# Patient Record
(demographics unavailable — no encounter records)

---

## 2025-03-18 NOTE — ASSESSMENT
[FreeTextEntry1] : The patient comes in today for follow-up on her left hip.  She is now 2 months out from an open reduction internal fixation of intertrochanteric hip fracture.  Overall she is doing very well.  She is starting to walk again with physical therapy.  She does continue to have some pain and requires oxycodone 5 mg twice a day.  Examination of the left lower extremity reveals a normal neurovascular exam.  Her leg lengths are equal.  Examination left hip reveals slight decrease in range of motion with pain at the extremes.  There is good strength of the hip flexor and abductor.  Her scar is well-healed with no signs of infection or DVT.  There is mild point tenderness over the greater trochanter.  X-rays done in the office today the AP pelvis 1 view of the left hip 2 views shows the intertrochanteric hip fracture on the left side to be healing in anatomic position with appropriate position of the hardware.  There is no penetration to the joint and there is no secondary arthritis.  The right hip has no arthritic changes. There are no obvious tumors, mass or calcifications seen.  The plan at this time is physical therapy for the left hip.  I will see her back in the office as necessary.

## 2025-03-18 NOTE — HISTORY OF PRESENT ILLNESS
[Meds] : meds [] : yes [FreeTextEntry5] : 87 y/o F presents for PO #1 eval today. Pt reports of some continued pain with recovery. Home PT 2x weekly.

## 2025-03-18 NOTE — HISTORY OF PRESENT ILLNESS
[Meds] : meds [] : yes [FreeTextEntry5] : 89 y/o F presents for PO #1 eval today. Pt reports of some continued pain with recovery. Home PT 2x weekly.

## 2025-07-29 NOTE — PHYSICAL EXAM
[FreeTextEntry1] : PE: Constitutional: In NAD, calm and cooperative MSK (Back)  Inspection: no gross swelling identified  Palpation: Tenderness midline at T9 SP, also mid thoracic paraspinals on R  ROM: Pain at end lumbar flexion; improves with extension  Strength: 4-/5 in b/l HF due to pain; 4+/5 b/l KE due to pain; 4/5 due to pain in R DF/PF; 4+/5 due to pain in L DF/PF  Reflexes: 1+ Patella reflex bilaterally, 1 Achilles reflex bilaterally, negative clonus bilaterally  Sensation: Decreased light touch in bilateral feet, chronic per patient Special tests: SLR: negative bilaterally

## 2025-07-29 NOTE — DATA REVIEWED
[FreeTextEntry1] : ACC: [id number] EXAM: CT ABDOMEN AND PELVIS IC ORDERED BY: SRI HAMILTON  ACC: [id number] EXAM: CT CHEST IC ORDERED BY: SRI HAMILTON  PROCEDURE DATE: 01/15/2025    INTERPRETATION: CLINICAL INFORMATION: Status post fall. Epigastric pain.  COMPARISON: Noncontrast CT of the thorax, abdomen, and pelvis [facility], 2024.  CONTRAST/COMPLICATIONS: IV Contrast: Omnipaque 350 (accession [id number]), NONE (accession [id number]) 90 cc administered 0 cc discarded Oral Contrast: NONE .  PROCEDURE: CT of the Chest, Abdomen and Pelvis was performed. Sagittal and coronal reformats were performed.  FINDINGS: CHEST: LUNGS AND LARGE AIRWAYS: Patent central airways. Stable 5 mm triangular nodule in the lingula. Stable bronchiectasis, mucus plugging, and volume loss in the right middle lobe. Stable biapical pleural thickening. Mild bilateral lower lobe dependent atelectasis. PLEURA: Trace bilateral layering pleural effusions. No pneumothorax or pneumomediastinum. VESSELS: Within normal limits. HEART: Heart size is normal. No pericardial effusion. MEDIASTINUM AND JUVENAL: No lymphadenopathy. CHEST WALL AND LOWER NECK: Within normal limits.  ABDOMEN AND PELVIS: LIVER: Within normal limits. BILE DUCTS: Normal caliber. GALLBLADDER: Multiple small gallstones in a phrygian cap. SPLEEN: Within normal limits. PANCREAS: Within normal limits. ADRENALS: Within normal limits. KIDNEYS/URETERS: Within normal limits.  BLADDER: Zaldivar catheter in the collapsed bladder. REPRODUCTIVE ORGANS: Uterus and adnexa within normal limits.  BOWEL: No bowel obstruction. Appendix is normal. PERITONEUM/RETROPERITONEUM: Within normal limits. VESSELS: Moderate atherosclerotic disease of the nonaneurysmal abdominal aorta. LYMPH NODES: No lymphadenopathy. ABDOMINAL WALL: Within normal limits. BONES: Acute mild compression deformity of T11. Chronic severe compression deformity of T9. Healed right anterolateral rib fractures. Nondisplaced intertrochanteric fracture of the left femur. Healing fracture of the left inferior pubic ramus. Healed left sacral ala fracture.  IMPRESSION: Left femur intertrochanteric fracture. Acute T11 compression fracture. No evidence of acute visceral organ injury.  --- End of Report ---      AUGUSTA VILLALOBOS MD; Attending Radiologist This document has been electronically signed. Jag 15  ACC: [id number] EXAM: CT ABDOMEN AND PELVIS ORDERED BY: MICK FRAGA  ACC: [id number] EXAM: CT CHEST ORDERED BY: MICK FRAGA  PROCEDURE DATE: 2024    INTERPRETATION: CLINICAL INFORMATION: Given history is an 87-year-old female status post fall with head strike complaining of headache, neck pain, back pain and bilateral rib pain.  COMPARISON: No prior exams available for comparison. Comparison to prior CT abdomen and pelvis report of 2008  CONTRAST/COMPLICATIONS: IV Contrast: NONE Oral Contrast: NONE Complications: None reported at time of study completion  PROCEDURE: CT of the Chest, Abdomen and Pelvis was performed. Sagittal and coronal reformats were performed.  FINDINGS: CHEST: LUNGS AND LARGE AIRWAYS: Patent central airways. Biapical scarring/fibrosis.. The right middle lobe appears diminutive with subsegmental atelectasis and bronchiolectasis. Few subcentimeter nodules are seen within the right middle lobe. Additionally bandlike consolidation and/or scarring is seen anteriorly within the right upper lobe inferiorly. Linear atelectasis or scarring within the inferior lingula with adjacent bronchiolectasis.Few scattered subcentimeter nodules are noted bilaterally with reference nodules: Right lun mm nodular opacity right upper lobe anteriorly series 5 image 46. 3 mm nodule right upper lobe posteriorly image 66. 2 mm calcified nodule is seen within the right middle lobe abutting the fissure and 1 to 2 mm calcified nodule right lung base image 140 likely represent calcified granulomata.  Left lun mm nodule left upper lobe image 72 3 mm nodule left upper lobe anteriorly image 93 3 mm nodule left upper lobe image 107 7 mm nodule inferior lingula image 132 . PLEURA: No pleural effusion. Trace pleural thickening posteriorly bilateral mid to lower hemithoraces. No pneumothorax VESSELS: Within normal limits of caliber. Scattered atherosclerotic calcifications are seen throughout the thoracic aorta and its branches.. HEART: Heart size is normal. Trace pericardial effusion. Coronary artery calcifications. Minimal calcification of the aortic valve, aortic root and mitral annulus. MEDIASTINUM AND JUVENAL: No enlarged lymphadenopathy. Amorphous calcifications are seen within the right lower paratracheal space and right hilum consistent with calcified lymph nodes. No pneumomediastinum or mediastinal hematoma. CHEST WALL AND LOWER NECK: Mild subcutaneous edema. No hematoma or collection. The included unenhanced lower neck is Within normal limits.  ABDOMEN AND PELVIS:  The study is limited in evaluating the visceral abdominal pelvic organs without the use of intravenous contrast. LIVER: Within normal limits. No perihepatic fluid or collection. BILE DUCTS: Normal caliber. GALLBLADDER: Uncomplicated cholelithiasis. SPLEEN: Within normal limits. No perisplenic fluid. PANCREAS: Atrophic pancreas. ADRENALS: Within normal limits. KIDNEYS/URETERS: Within normal limits. No perinephric fluid/hemorrhage. BLADDER: Within normal limits. REPRODUCTIVE ORGANS: Peripheral calcification of the uterus likely uterine artery/arcuate artery calcifications as can be seen in elderly postmenopausal females.  BOWEL: No bowel obstruction. Appendix is normal in appearance. PERITONEUM: No ascites abscess or pneumoperitoneum. VESSELS: Calcification of the abdominal aorta, its branches and iliac arteries otherwise Within normal limits. RETROPERITONEUM/LYMPH NODES: No lymphadenopathy. No retroperitoneal hematoma/hemorrhage. ABDOMINAL WALL: Mild subcutaneous edema. No fluid collection, hematoma or abscess. A small fat-containing umbilical hernia. Calcifications are seen within the subcutaneous tissues of both gluteal regions may be related to injection granulomas. Within normal limits. BONES: Bones are osteopenic. Wedge compression fracture deformity/vertebral plana of of T9 .Correlate with history. Further evaluation with MR imaging can be obtained as indicated. No evidence of additional acute fractures involving the chest. No evidence of acute rib fractures. Possible subtle few old rib fracture deformities of the right posterior eighth and ninth ribs. Healed left inferior pubic ramus fracture deformity  IMPRESSION: Wedge compression fracture deformity of T9. Further evaluation with MR imaging can be obtained.  No acute rib fractures. No additional acute fractures of the chest abdomen and pelvis.  No CT evidence of visceral abdominal or pelvic organ injury.  No pneumomediastinum, pneumothorax or pneumoperitoneum. No mediastinal hematoma or retroperitoneal hematoma/hemorrhage.  Bilateral pulmonary nodules, the largest measuring 7 mm within the lingula. This is nonspecific. Recommend follow-up in 6-12 months to assess change or stability.  Findings suggestive of prior pulmonary granulomatous disease.  Please read above.  --- End of Report ---      JAVID TINAJERO MD; Attending Radiologist This document has been electronically signed. Max 3

## 2025-07-29 NOTE — END OF VISIT
[] : Resident [FreeTextEntry3] : I have personally seen, examined, and participated in the care of this patient. I was physically present for key portions of the evaluation and management service provided and I have reviewed all pertinent clinical information.  I agree with the resident's history, physical exam, and plan which I reviewed and edited where appropriate.